# Patient Record
Sex: FEMALE | Race: WHITE | Employment: FULL TIME
[De-identification: names, ages, dates, MRNs, and addresses within clinical notes are randomized per-mention and may not be internally consistent; named-entity substitution may affect disease eponyms.]

---

## 2017-09-22 ENCOUNTER — HEALTH MAINTENANCE LETTER (OUTPATIENT)
Age: 57
End: 2017-09-22

## 2021-09-07 ENCOUNTER — APPOINTMENT (OUTPATIENT)
Dept: GENERAL RADIOLOGY | Facility: CLINIC | Age: 61
End: 2021-09-07
Attending: FAMILY MEDICINE
Payer: COMMERCIAL

## 2021-09-07 ENCOUNTER — HOSPITAL ENCOUNTER (EMERGENCY)
Facility: CLINIC | Age: 61
Discharge: HOME | End: 2021-09-07
Attending: FAMILY MEDICINE | Admitting: FAMILY MEDICINE
Payer: COMMERCIAL

## 2021-09-07 VITALS
OXYGEN SATURATION: 93 % | TEMPERATURE: 98.6 F | WEIGHT: 150 LBS | SYSTOLIC BLOOD PRESSURE: 124 MMHG | HEART RATE: 90 BPM | BODY MASS INDEX: 24.08 KG/M2 | RESPIRATION RATE: 16 BRPM | DIASTOLIC BLOOD PRESSURE: 67 MMHG

## 2021-09-07 DIAGNOSIS — U07.1 2019 NOVEL CORONAVIRUS DISEASE (COVID-19): ICD-10-CM

## 2021-09-07 DIAGNOSIS — J18.9 PNEUMONIA OF RIGHT LOWER LOBE DUE TO INFECTIOUS ORGANISM: ICD-10-CM

## 2021-09-07 LAB
ANION GAP SERPL CALCULATED.3IONS-SCNC: 9 MMOL/L (ref 3–14)
BASOPHILS # BLD AUTO: 0 10E3/UL (ref 0–0.2)
BASOPHILS NFR BLD AUTO: 0 %
BUN SERPL-MCNC: 15 MG/DL (ref 7–30)
CALCIUM SERPL-MCNC: 9.1 MG/DL (ref 8.5–10.1)
CHLORIDE BLD-SCNC: 99 MMOL/L (ref 94–109)
CO2 SERPL-SCNC: 25 MMOL/L (ref 20–32)
CREAT SERPL-MCNC: 0.98 MG/DL (ref 0.52–1.04)
EOSINOPHIL # BLD AUTO: 0 10E3/UL (ref 0–0.7)
EOSINOPHIL NFR BLD AUTO: 0 %
ERYTHROCYTE [DISTWIDTH] IN BLOOD BY AUTOMATED COUNT: 11.9 % (ref 10–15)
GFR SERPL CREATININE-BSD FRML MDRD: 62 ML/MIN/1.73M2
GLUCOSE BLD-MCNC: 101 MG/DL (ref 70–99)
HCT VFR BLD AUTO: 45.5 % (ref 35–47)
HGB BLD-MCNC: 15.8 G/DL (ref 11.7–15.7)
IMM GRANULOCYTES # BLD: 0 10E3/UL
IMM GRANULOCYTES NFR BLD: 0 %
LYMPHOCYTES # BLD AUTO: 0.9 10E3/UL (ref 0.8–5.3)
LYMPHOCYTES NFR BLD AUTO: 19 %
MCH RBC QN AUTO: 30.8 PG (ref 26.5–33)
MCHC RBC AUTO-ENTMCNC: 34.7 G/DL (ref 31.5–36.5)
MCV RBC AUTO: 89 FL (ref 78–100)
MONOCYTES # BLD AUTO: 0.3 10E3/UL (ref 0–1.3)
MONOCYTES NFR BLD AUTO: 6 %
NEUTROPHILS # BLD AUTO: 3.5 10E3/UL (ref 1.6–8.3)
NEUTROPHILS NFR BLD AUTO: 75 %
NRBC # BLD AUTO: 0 10E3/UL
NRBC BLD AUTO-RTO: 0 /100
PLATELET # BLD AUTO: 182 10E3/UL (ref 150–450)
POTASSIUM BLD-SCNC: 4.3 MMOL/L (ref 3.4–5.3)
RBC # BLD AUTO: 5.13 10E6/UL (ref 3.8–5.2)
SODIUM SERPL-SCNC: 133 MMOL/L (ref 133–144)
WBC # BLD AUTO: 4.7 10E3/UL (ref 4–11)

## 2021-09-07 PROCEDURE — 71045 X-RAY EXAM CHEST 1 VIEW: CPT

## 2021-09-07 PROCEDURE — 99284 EMERGENCY DEPT VISIT MOD MDM: CPT | Mod: 25

## 2021-09-07 PROCEDURE — 99284 EMERGENCY DEPT VISIT MOD MDM: CPT | Performed by: FAMILY MEDICINE

## 2021-09-07 PROCEDURE — 80048 BASIC METABOLIC PNL TOTAL CA: CPT | Performed by: FAMILY MEDICINE

## 2021-09-07 PROCEDURE — 36415 COLL VENOUS BLD VENIPUNCTURE: CPT | Performed by: FAMILY MEDICINE

## 2021-09-07 PROCEDURE — 250N000013 HC RX MED GY IP 250 OP 250 PS 637: Performed by: FAMILY MEDICINE

## 2021-09-07 PROCEDURE — 85025 COMPLETE CBC W/AUTO DIFF WBC: CPT | Performed by: FAMILY MEDICINE

## 2021-09-07 PROCEDURE — 250N000012 HC RX MED GY IP 250 OP 636 PS 637: Performed by: FAMILY MEDICINE

## 2021-09-07 RX ORDER — AZITHROMYCIN 250 MG/1
250 TABLET, FILM COATED ORAL DAILY
Qty: 4 TABLET | Refills: 0 | Status: SHIPPED | OUTPATIENT
Start: 2021-09-08 | End: 2021-09-12

## 2021-09-07 RX ORDER — AZITHROMYCIN 250 MG/1
500 TABLET, FILM COATED ORAL ONCE
Status: COMPLETED | OUTPATIENT
Start: 2021-09-07 | End: 2021-09-07

## 2021-09-07 RX ORDER — DEXAMETHASONE 6 MG/1
6 TABLET ORAL DAILY
Qty: 9 TABLET | Refills: 0 | Status: SHIPPED | OUTPATIENT
Start: 2021-09-08

## 2021-09-07 RX ORDER — AMOXICILLIN 500 MG/1
500 CAPSULE ORAL 3 TIMES DAILY
Qty: 30 CAPSULE | Refills: 0 | Status: SHIPPED | OUTPATIENT
Start: 2021-09-08 | End: 2021-09-18

## 2021-09-07 RX ORDER — AMOXICILLIN 500 MG/1
1000 CAPSULE ORAL ONCE
Status: COMPLETED | OUTPATIENT
Start: 2021-09-07 | End: 2021-09-07

## 2021-09-07 RX ADMIN — AMOXICILLIN 1000 MG: 500 CAPSULE ORAL at 22:18

## 2021-09-07 RX ADMIN — AZITHROMYCIN MONOHYDRATE 500 MG: 250 TABLET ORAL at 22:19

## 2021-09-07 RX ADMIN — DEXAMETHASONE 6 MG: 2 TABLET ORAL at 22:18

## 2021-09-07 NOTE — ED TRIAGE NOTES
Pt presents with concerns for low oxygen saturations at home with home oximeter. Pt dx with covid Sunday. Had an e-visit Saturday.

## 2021-09-08 NOTE — DISCHARGE INSTRUCTIONS
Take the Zithromax amoxicillin as directed to cover any potential bacterial component to the pneumonia.    Take the Decadron 6 mg daily as directed.  Recheck in clinic if persistent problems.  Return to the ED if you worsen or have any concerns.  Fortunately your oxygen levels were still okay tonight in the ED.  If they are consistently below 90%, please return for reevaluation.  It was nice visiting with you tonight.  I hope you feel better soon.    Thank you for choosing [facility]. We appreciate the opportunity to meet your urgent medical needs. Please let us know if we could have done anything to make your stay more satisfying.    After discharge, please closely monitor for any new or worsening symptoms. Return to the Emergency Department if you develop any acute worsening signs or symptoms.    If you had lab work, cultures or imaging studies done during your stay, the final results may still be pending. We will call you if your plan of care needs to change. However, if you are not improving as expected, please follow up with your primary care provider or clinic.     Start any prescription medications that were prescribed to you and take them as directed.     Please see additional handouts that may be pertinent to your condition.

## 2021-09-08 NOTE — ED PROVIDER NOTES
History     Chief Complaint   Patient presents with     Shortness of Breath     HPI  Criss Quispe is a 61 year old female who presents to the ED tonight with shortness of breath and concern for low oxygen sats at home.  She has been sick for 8 days and went in to urgent care on Sunday, 2 days ago and tested positive for Covid.  She has an oximeter at home.  States the readings have been inconsistent ranged between 85 and 91%.  She has a cough especially with deep inspiration bringing up occasionally some clear sputum.  Generalized weakness.  A bit lightheaded if she gets up too quick.  Also has a headache.  Fever is actually improved.  She has been using Tylenol.  She has no underlying lung issues.  No heart disease or diabetes.  Otherwise in general good health.  She is a bit hesitant to get the vaccines since she has had issues with her throat swelling shot with influenza shot in the past.  No one else at home is sick but she has been quarantining in her bedroom.  Some nausea.  Been able to eat fruit and drink liquids.  Still urinating.  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          Allergies:  Allergies   Allergen Reactions     Codeine        Problem List:    Patient Active Problem List    Diagnosis Date Noted     Migraine headache with aura      Priority: Medium        Past Medical History:    Past Medical History:   Diagnosis Date     Migraine headache with aura        Past Surgical History:    Past Surgical History:   Procedure Laterality Date     D & C       HEMORRHOIDECTOMY       SURGICAL HISTORY OF -       Bilateral bunion surgery     SURGICAL HISTORY OF -   2004    Left nephrectomy--donation to her father     SURGICAL HISTORY OF -   2002    Spinal fusion L5/S1 with discexctomy     SURGICAL HISTORY OF -   2006    Left hand surgery for arthritis     TONSILLECTOMY         Family History:    Family History   Problem Relation Age of Onset     Cancer Sister         brain, liver, lung      Hypertension Mother      Hyperlipidemia Mother      Breast Cancer Mother         remission     Diabetes Father      Prostate Cancer Father         prostate and bones       Social History:  Marital Status:   [2]  Social History     Tobacco Use     Smoking status: Former Smoker     Packs/day: 1.00     Years: 20.00     Pack years: 20.00     Types: Cigarettes     Quit date: 2002     Years since quittin.2     Smokeless tobacco: Never Used   Substance Use Topics     Alcohol use: Yes     Comment: wine 2 glasses per night     Drug use: No        Medications:    [START ON 2021] amoxicillin (AMOXIL) 500 MG capsule  [START ON 2021] azithromycin (ZITHROMAX) 250 MG tablet  [START ON 2021] dexamethasone (DECADRON) 6 MG tablet  Acetaminophen (TYLENOL PO)  calcium carbonate (OS-FANTASMA 500 MG [place]. [place]) 500 MG tablet  cephALEXin (KEFLEX) 500 MG capsule  Cholecalciferol (VITAMIN D-3 PO)  Multiple Vitamins-Minerals (MULTIVITAMIN OR)  Omega-3 Fatty Acids (OMEGA-3 FISH OIL PO)  Rizatriptan Benzoate (MAXALT PO)          Review of Systems   All other systems reviewed and are negative.      Physical Exam   BP: 124/67  Pulse: 90  Temp: 98.6  F (37  C)  Resp: 16  Weight: 68 kg (150 lb)  SpO2: 93 %      Physical Exam  Constitutional:       General: She is in acute distress (mild).      Appearance: She is well-developed.   HENT:      Mouth/Throat:      Mouth: Mucous membranes are moist.      Pharynx: Oropharynx is clear.   Cardiovascular:      Rate and Rhythm: Normal rate and regular rhythm.   Pulmonary:      Effort: Pulmonary effort is normal.      Breath sounds: Normal breath sounds.   Musculoskeletal:         General: Normal range of motion.   Skin:     General: Skin is warm and dry.   Neurological:      General: No focal deficit present.      Mental Status: She is alert and oriented to person, place, and time.   Psychiatric:         Mood and Affect: Mood normal.         ED Course  (with Medical Decision  Jeremy)    61-year-old with one kidney after donating one earlier diagnosed with Covid 2 days ago.  Has been sick for 8 days.  Oximeter at home reading between 85 to 91%.  She feels a little short of breath and is coughing up a small amount of clear sputum when she takes a deep breath.  Generalized weakness and a little lightheaded if she gets up too quickly.  She is able to drink and is still urinating.  Appetite has been down.    White count normal at 4.7.  Basic profile unremarkable.  Chest x-ray shows a subtle focal opacity at the right lung base and left lung base atelectasis versus mild airspace disease.  Pneumonia is not excluded.  I am going to cover her for potential bacterial component with Zithromax and amoxicillin and add Decadron 6 mg daily first doses were given in the ED and prescription sent to her pharmacy that she can fill tomorrow.  Her O2 sats have been 91 to 93% consistently here in the emergency department.  She does not meet admission criteria at this point and unfortunately there are no open beds in the entire system either.  She is maintaining her oxygenation on room air.  Certainly could worsen we will have to watch closely for that.  Verbal and written discharge instructions given.  She is comfortable with this plan.              Procedures              Critical Care time:  none               Results for orders placed or performed during the hospital encounter of 09/07/21 (from the past 24 hour(s))   Basic metabolic panel   Result Value Ref Range    Sodium 133 133 - 144 mmol/L    Potassium 4.3 3.4 - 5.3 mmol/L    Chloride 99 94 - 109 mmol/L    Carbon Dioxide (CO2) 25 20 - 32 mmol/L    Anion Gap 9 3 - 14 mmol/L    Urea Nitrogen 15 7 - 30 mg/dL    Creatinine 0.98 0.52 - 1.04 mg/dL    Calcium 9.1 8.5 - 10.1 mg/dL    Glucose 101 (H) 70 - 99 mg/dL    GFR Estimate 62 >60 mL/min/1.73m2   CBC with platelets differential    Narrative    The following orders were created for panel order CBC with  platelets differential.  Procedure                               Abnormality         Status                     ---------                               -----------         ------                     CBC with platelets and d...[[id number]]  Abnormal            Final result                 Please view results for these tests on the individual orders.   CBC with platelets and differential   Result Value Ref Range    WBC Count 4.7 4.0 - 11.0 10e3/uL    RBC Count 5.13 3.80 - 5.20 10e6/uL    Hemoglobin 15.8 (H) 11.7 - 15.7 g/dL    Hematocrit 45.5 35.0 - 47.0 %    MCV 89 78 - 100 fL    MCH 30.8 26.5 - 33.0 pg    MCHC 34.7 31.5 - 36.5 g/dL    RDW 11.9 10.0 - 15.0 %    Platelet Count 182 150 - 450 10e3/uL    % Neutrophils 75 %    % Lymphocytes 19 %    % Monocytes 6 %    % Eosinophils 0 %    % Basophils 0 %    % Immature Granulocytes 0 %    NRBCs per 100 WBC 0 <1 /100    Absolute Neutrophils 3.5 1.6 - 8.3 10e3/uL    Absolute Lymphocytes 0.9 0.8 - 5.3 10e3/uL    Absolute Monocytes 0.3 0.0 - 1.3 10e3/uL    Absolute Eosinophils 0.0 0.0 - 0.7 10e3/uL    Absolute Basophils 0.0 0.0 - 0.2 10e3/uL    Absolute Immature Granulocytes 0.0 <=0.0 10e3/uL    Absolute NRBCs 0.0 10e3/uL   XR Chest Port 1 View    Narrative    CHEST ONE VIEW PORTABLE    9/7/2021 9:18 PM     HISTORY: COVID positive, cough, short of air.    COMPARISON: None.      Impression    IMPRESSION: Subtle focal opacity at the right lung base and left lung  base atelectasis versus mild airspace disease. Pneumonia not excluded.  Recommend follow-up chest x-ray or CT to ensure resolution in one  month. Normal cardiac silhouette.    CLAUDE NETTLES MD         SYSTEM ID:  [email or web address]       Medications   azithromycin (ZITHROMAX) tablet 500 mg (has no administration in time range)   amoxicillin (AMOXIL) capsule 1,000 mg (has no administration in time range)   dexamethasone (DECADRON) tablet 6 mg (has no administration in time range)       Assessments & Plan (with Medical Decision Making)      I have reviewed the nursing notes.    I have reviewed the findings, diagnosis, plan and need for follow up with the patient.       New Prescriptions    AMOXICILLIN (AMOXIL) 500 MG CAPSULE    Take 1 capsule (500 mg) by mouth 3 times daily for 10 days    AZITHROMYCIN (ZITHROMAX) 250 MG TABLET    Take 1 tablet (250 mg) by mouth daily for 4 doses    DEXAMETHASONE (DECADRON) 6 MG TABLET    Take 1 tablet (6 mg) by mouth daily       Final diagnoses:   2019 novel coronavirus disease (COVID-19)   Pneumonia of right lower lobe due to infectious organism       9/7/2021   [facility] EMERGENCY DEPT     Philippe Gonzales MD  09/07/21 3012